# Patient Record
Sex: MALE | Race: WHITE | NOT HISPANIC OR LATINO | Employment: FULL TIME | URBAN - METROPOLITAN AREA
[De-identification: names, ages, dates, MRNs, and addresses within clinical notes are randomized per-mention and may not be internally consistent; named-entity substitution may affect disease eponyms.]

---

## 2024-10-18 ENCOUNTER — OFFICE VISIT (OUTPATIENT)
Dept: PODIATRY | Facility: CLINIC | Age: 24
End: 2024-10-18
Payer: COMMERCIAL

## 2024-10-18 VITALS
OXYGEN SATURATION: 97 % | BODY MASS INDEX: 29.8 KG/M2 | WEIGHT: 220 LBS | RESPIRATION RATE: 18 BRPM | HEIGHT: 72 IN | HEART RATE: 89 BPM

## 2024-10-18 DIAGNOSIS — L60.0 INGROWN TOENAIL OF LEFT FOOT: Primary | ICD-10-CM

## 2024-10-18 DIAGNOSIS — M79.675 GREAT TOE PAIN, LEFT: ICD-10-CM

## 2024-10-18 DIAGNOSIS — L92.9 PROUD FLESH: ICD-10-CM

## 2024-10-18 PROCEDURE — 11730 AVULSION NAIL PLATE SIMPLE 1: CPT

## 2024-10-18 PROCEDURE — 99202 OFFICE O/P NEW SF 15 MIN: CPT

## 2024-10-21 NOTE — PROGRESS NOTES
10/18/2024  Foot and Ankle Surgery - New Patient   Provider: DONALD Ghosh   Location: [facility]    Subjective:  Felipe Santiago is a 24 y.o. male.     Chief Complaint   Patient presents with    Left Foot - Ingrown Toenail, Initial Evaluation     He does not have PCP.       HPI: Presents to office today with complaints of ingrown toenail to the left great toe.  Patient reports he has been having symptoms with the toe for a couple months but it has gotten really bad the last few weeks.  He was prescribed an oral antibiotic by his primary care provider but did not start taking it.  Is here today with adult female.  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    No Known Allergies    Past Medical History:   Diagnosis Date    Asthma     Ingrown toenail        History reviewed. No pertinent surgical history.    History reviewed. No pertinent family history.    Social History     Socioeconomic History    Marital status:    Tobacco Use    Smoking status: Some Days     Types: Pipe   Substance and Sexual Activity    Alcohol use: Yes    Drug use: Never    Sexual activity: Yes        Current Outpatient Medications on File Prior to Visit   Medication Sig Dispense Refill    sulfamethoxazole-trimethoprim (BACTRIM DS,SEPTRA DS) 800-160 MG per tablet Take 1 tablet by mouth 2 (Two) Times a Day for 10 days. (Patient not taking: Reported on 10/18/2024) 20 tablet 0     No current facility-administered medications on file prior to visit.       Review of Systems:  General: Denies fever, chills, fatigue, and weakness.  Eyes: Denies vision loss, blurry vision, and excessive redness.  ENT: Denies hearing issues and difficulty swallowing.  Cardiovascular: Denies palpitations, chest pain, or syncopal episodes.  Respiratory: Denies shortness of breath, wheezing, and coughing.  GI: Denies abdominal pain, nausea, and vomiting.   : Denies frequency, hematuria, and  "urgency.  Musculoskeletal: Denies muscle cramps, joint pains, and stiffness.  Derm: Denies rash, open wounds, or suspicious lesions.  Neuro: Denies headaches, numbness, loss of coordination, and tremors.  Psych: Denies anxiety and depression.  Endocrine: Denies temperature intolerance and changes in appetite.  Heme: Denies bleeding disorders or abnormal bruising.     Objective   Pulse 89   Resp 18   Ht 182.9 cm (72\")   Wt 99.8 kg (220 lb)   SpO2 97%   BMI 29.84 kg/m²     Foot/Ankle Exam    GENERAL  Appearance:  appears stated age  Orientation:  AAOx3  Affect:  appropriate  Gait:  unimpaired  Assistance:  independent  Right shoe gear: casual shoe  Left shoe gear: casual shoe    VASCULAR     Left Foot Vascularity   Normal vascular exam    Dorsalis pedis:  2+  Posterior tibial:  2+  Skin temperature:  warm  Edema grading:  None  CFT:  < 3 seconds  Pedal hair growth:  Present  Varicosities:  none     NEUROLOGIC     Left Foot Neurologic   Normal sensation    Light touch sensation: normal  Vibratory sensation: normal  Hot/Cold sensation:  normal    MUSCULOSKELETAL     Left Foot Musculoskeletal   Ecchymosis:  none  Tenderness:  toenail problem    DERMATOLOGIC        Left Foot Dermatologic   Skin  Positive for drainage, erythema and skin changes.   Nails  1.  Positive for ingrown toenail.    Assessment & Plan   Diagnoses and all orders for this visit:    1. Ingrown toenail of left foot (Primary)    2. Proud flesh    3. Great toe pain, left      On exam patient has ingrown toenail to the left great toenail symptoms significantly at the lateral border with swelling erythema slight drainage.  Discussed pathophysiology, treatment options and prevention of ingrown toenails.  Patient would like to proceed with total nail removal which I do feel is the best option at this time.    Total Nail Avulsion: left hallux    Consent and time out was performed before proceeding with the procedure.  Left hallux was cleansed with alcohol " and the digit was blocked in a ring block fashion utilizing 10 mL of 1% lidocaine plain.  A digital tourniquet was applied to the digit.  The nail was lifted from the nail bed and nail margins with a [place]. The offending nail margins were grasped with a hemostat and removed.  The nail borders were explored with a curette to ensure adequate removal.  The nail fold and exposed nail bed were flushed with normal saline.  Antibiotic ointment followed by sterile compressive dressings were then applied.  The digital tourniquot was removed.  No excessive bleeding or complications were evident.  The patient tolerated procedure and local anesthetic well.       Pt can follow up prn.    No orders of the defined types were placed in this encounter.       Note is dictated utilizing voice recognition software. Unfortunately this leads to occasional typographical errors. I apologize in advance if the situation occurs. If questions occur please do not hesitate to call our office.

## 2024-10-21 NOTE — PATIENT INSTRUCTIONS
Post Nail Removal Instructions    Keep today's bandage on until tomorrow. There should be minimal bleeding after the procedure; however, if there is oozing or dripping blood, elevate your foot and contact the office at [phone].    Starting day 2: Remove the dressing and wash the toe well with warm soapy water. You may shower as needed then dry the area thoroughly and dress wound with antibiotic ointment and a Band-aid. Soak the foot in warm soapy water or water with Epsom salt for 20 minutes twice daily as needed. Continue this process for the first week.    After the first week: You can discontinue the antibiotic ointment and keep the wound covered with a Band-aid during the day, but leave open to air at night. Soaks can continue if desired.    If an antibiotic prescription was dispensed, please fill as soon as possible and take as instructed.    Pain and discomfort are typically mild after this procedure which does not require the use of narcotic pain medication. If you do experience discomfort, it is recommended that  you try over the counter pain relievers such as ibuprofen or Tylenol.     It is very common to notice mild redness and clear drainage after the procedure, but if increased pain, swelling, redness, or significant discharge is noticed, call the office to notify your provider.       What Is an Ingrown Toenail?  When a toenail is ingrown, it is curved and grows into the skin, usually at the nail borders (the sides of the nail). This “digging in” of the nail irritates the skin, often creating pain, redness, swelling, and warmth in the toe.    If an ingrown nail causes a break in the skin, bacteria may enter and cause an infection in the area, which is often marked by drainage and a foul odor. However, even if the toe isn’t painful, red, swollen, or warm, a nail that curves downward into the skin can progress to an infection.        Causes  Causes of ingrown toenails include:  Heredity. In many  people, the tendency for ingrown toenails is inherited.  Trauma. Sometimes an ingrown toenail is the result of trauma, such as stubbing your toe, having an object fall on your toe, or engaging in activities that involve repeated pressure on the toes, such as kicking or running.  Improper trimming. The most common cause of ingrown toenails is cutting your nails too short. This encourages the skin next to the nail to fold over the nail.   Improperly sized footwear. Ingrown toenails can result from wearing socks and shoes that are tight or short.   Nail Conditions. Ingrown toenails can be caused by nail problems, such as fungal infections or losing a nail due to trauma.    Treatment  Sometimes initial treatment for ingrown toenails can be safely performed at home. However, home treatment is strongly discouraged if an infection is suspected, or for those who have medical conditions that put feet at high risk, such as diabetes, nerve damage in the foot, or poor circulation.    Home care:  If you don’t have an infection or any of the above medical conditions, you can soak your foot in room-temperature water (adding Epsom’s salt may be recommended by your doctor), and gently massage the side of the nail fold to help reduce the inflammation.    Avoid attempting “bathroom surgery.” Repeated cutting of the nail can cause the condition to worsen over time. If your symptoms fail to improve, it’s time to see a foot and ankle surgeon.    Physician care:  After examining the toe, the foot and ankle surgeon will select the treatment best suited for you. If an infection is present, an oral antibiotic may be prescribed.    Sometimes a minor surgical procedure, often performed in the office, will ease the pain and remove the offending nail. After applying a local anesthetic, the doctor removes part of the nail’s side border. Some nails may become ingrown again, requiring removal of the nail root.    Following the nail procedure, a  light bandage will be applied. Most people experience very little pain after surgery and may resume normal activity the next day. If your surgeon has prescribed an oral antibiotic, be sure to take all the medication, even if your symptoms have improved.    Preventing Ingrown Toenails  Many cases of ingrown toenails may be prevented by:  Proper trimming. Cut toenails in a fairly straight line, and don’t cut them too short. You should be able to get your fingernail under the sides and end of the nail.  Well-fitted shoes and socks. Don’t wear shoes that are short or tight in the toe area. Avoid shoes that are loose, because they too cause pressure on the toes, especially when running or walking briskly.      What You Should Know About Home Treatment    Don't cut a notch in the nail. Contrary to what some people believe, this does not reduce the tendency for the nail to curve downward.    Don't repeatedly trim nail borders. Repeated trimming does not change the way the nail grows, and can make the condition worse.    Don't place cotton under the nail. Not only does this not relieve the pain, it provides a place for harmful bacteria to grow, resulting in infection.    Over-the-counter medications are ineffective. Topical medications may mask the pain, but they don't correct the underlying problem.